# Patient Record
Sex: FEMALE | Race: OTHER | Employment: OTHER | URBAN - METROPOLITAN AREA
[De-identification: names, ages, dates, MRNs, and addresses within clinical notes are randomized per-mention and may not be internally consistent; named-entity substitution may affect disease eponyms.]

---

## 2017-11-09 ENCOUNTER — APPOINTMENT (OUTPATIENT)
Dept: GENERAL RADIOLOGY | Facility: HOSPITAL | Age: 22
End: 2017-11-09
Attending: NURSE PRACTITIONER
Payer: MEDICAID

## 2017-11-09 ENCOUNTER — TELEPHONE (OUTPATIENT)
Dept: INTERNAL MEDICINE CLINIC | Facility: CLINIC | Age: 22
End: 2017-11-09

## 2017-11-09 ENCOUNTER — HOSPITAL ENCOUNTER (EMERGENCY)
Facility: HOSPITAL | Age: 22
Discharge: HOME | End: 2017-11-09
Payer: MEDICAID

## 2017-11-09 VITALS
SYSTOLIC BLOOD PRESSURE: 102 MMHG | WEIGHT: 119.94 LBS | HEART RATE: 72 BPM | OXYGEN SATURATION: 100 % | DIASTOLIC BLOOD PRESSURE: 72 MMHG | TEMPERATURE: 98 F | RESPIRATION RATE: 16 BRPM | BODY MASS INDEX: 23 KG/M2

## 2017-11-09 DIAGNOSIS — M79.671 RIGHT FOOT PAIN: Primary | ICD-10-CM

## 2017-11-09 PROCEDURE — 99283 EMERGENCY DEPT VISIT LOW MDM: CPT

## 2017-11-09 PROCEDURE — 73630 X-RAY EXAM OF FOOT: CPT | Performed by: NURSE PRACTITIONER

## 2017-11-09 NOTE — ED PROVIDER NOTES
Patient Seen in: [facility] AND [facility] Emergency Department    History   Patient presents with:  Bump    Stated Complaint: foot pain    HPI    46-year-old female presents to the emergency department complaining of pain and swelling to the top of her right fo place, and time. No cranial nerve deficit. Skin: Skin is warm and dry. Nursing note and vitals reviewed.           ED Course   Labs Reviewed - No data to display    ED Course as of Nov 09 1324  -----------------------------------------------------------

## 2017-11-09 NOTE — TELEPHONE ENCOUNTER
Patient's mother calling - verified pt's  and HIPAA - states pt has a bump on her the top of her right foot that has been growing the last couple days, today the pain grew to the point where she went to the [facility] ER. (pt is still in the ER at this moment

## 2017-11-10 ENCOUNTER — OFFICE VISIT (OUTPATIENT)
Dept: PODIATRY CLINIC | Facility: CLINIC | Age: 22
End: 2017-11-10

## 2017-11-10 DIAGNOSIS — M67.471 GANGLION CYST OF RIGHT FOOT: Primary | ICD-10-CM

## 2017-11-10 PROCEDURE — 99212 OFFICE O/P EST SF 10 MIN: CPT | Performed by: PODIATRIST

## 2017-11-10 PROCEDURE — 99243 OFF/OP CNSLTJ NEW/EST LOW 30: CPT | Performed by: PODIATRIST

## 2017-11-10 NOTE — PROGRESS NOTES
HPI:    Patient ID: Real Guerra is a 23year old female. HPI  This 72-year-old female presents as a new patient to me on consult from .   She is accompanied today by her mom and the primary concern is a painful lump on the dorsal aspect of shoes with support at all times. I instructed icing twice every evening for 15 minutes for the next 2 weeks. Discontinue treatment and reevaluate a week later if there is any concern. I anticipate seeing this patient in 3 weeks.   She and her mom alesia

## 2018-03-02 ENCOUNTER — HOSPITAL ENCOUNTER (EMERGENCY)
Facility: HOSPITAL | Age: 23
Discharge: HOME | End: 2018-03-03
Attending: EMERGENCY MEDICINE
Payer: MEDICAID

## 2018-03-02 DIAGNOSIS — H10.32 ACUTE CONJUNCTIVITIS OF LEFT EYE, UNSPECIFIED ACUTE CONJUNCTIVITIS TYPE: Primary | ICD-10-CM

## 2018-03-02 PROCEDURE — 99283 EMERGENCY DEPT VISIT LOW MDM: CPT

## 2018-03-03 VITALS
HEART RATE: 82 BPM | DIASTOLIC BLOOD PRESSURE: 83 MMHG | HEIGHT: 60 IN | BODY MASS INDEX: 25.52 KG/M2 | RESPIRATION RATE: 14 BRPM | OXYGEN SATURATION: 100 % | SYSTOLIC BLOOD PRESSURE: 120 MMHG | TEMPERATURE: 99 F | WEIGHT: 130 LBS

## 2018-03-03 RX ORDER — CIPROFLOXACIN HYDROCHLORIDE 3.5 MG/ML
1 SOLUTION/ DROPS TOPICAL
Qty: 1 BOTTLE | Refills: 0 | Status: SHIPPED | OUTPATIENT
Start: 2018-03-03 | End: 2018-03-10

## 2018-03-03 NOTE — ED PROVIDER NOTES
Patient Seen in: [facility] AND [facility] Emergency Department    History   Patient presents with: Eye Visual Problem (opthalmic)      HPI    Patient presents to the ED complaining of redness and mild discharge to her left eye for the past 5 days.   She states are equal, round, and reactive to light. Right eye exhibits no discharge. Left eye exhibits no discharge. Mild injection of the left conjunctiva, no evidence for corneal ulceration, foreign body or abrasions. No discharge.    Neck: No tracheal deviation conjunctivitis of left eye, unspecified acute conjunctivitis type  (primary encounter diagnosis)    Disposition:  Discharge    Follow-up:  Riki Aragon MD  [address] [place] [id number] [phone]    Schedule an appointment as soon as yuni

## 2018-04-23 ENCOUNTER — HOSPITAL ENCOUNTER (OUTPATIENT)
Age: 23
Discharge: HOME | End: 2018-04-23
Attending: PEDIATRICS
Payer: MEDICAID

## 2018-04-23 VITALS
SYSTOLIC BLOOD PRESSURE: 113 MMHG | RESPIRATION RATE: 16 BRPM | DIASTOLIC BLOOD PRESSURE: 64 MMHG | OXYGEN SATURATION: 100 % | HEART RATE: 89 BPM | TEMPERATURE: 98 F

## 2018-04-23 DIAGNOSIS — N30.01 ACUTE CYSTITIS WITH HEMATURIA: Primary | ICD-10-CM

## 2018-04-23 PROCEDURE — 81002 URINALYSIS NONAUTO W/O SCOPE: CPT

## 2018-04-23 PROCEDURE — 81025 URINE PREGNANCY TEST: CPT

## 2018-04-23 PROCEDURE — 99214 OFFICE O/P EST MOD 30 MIN: CPT

## 2018-04-23 PROCEDURE — 99213 OFFICE O/P EST LOW 20 MIN: CPT

## 2018-04-23 RX ORDER — SULFAMETHOXAZOLE AND TRIMETHOPRIM 800; 160 MG/1; MG/1
1 TABLET ORAL 2 TIMES DAILY
Qty: 6 TABLET | Refills: 0 | Status: SHIPPED | OUTPATIENT
Start: 2018-04-23 | End: 2018-04-26

## 2018-04-23 NOTE — ED INITIAL ASSESSMENT (HPI)
Pt reporting dysuria and hematuria with urinary urgency and frequency for past couple of days. Denies Flank pain. Denies N/V/D. Denies fever.

## 2018-04-23 NOTE — ED PROVIDER NOTES
Patient Seen in: [facility] In [address] [place] [address]    History   Patient presents with:  Urinary Symptoms (urologic)    Stated Complaint: uti    HPI    Patient complains of urinary frequency, urgency and dysuria that began this am.  Patient d with hematuria  (primary encounter diagnosis)    Disposition:  Discharge    Follow-up:  Deja Cole MD  [address] [place] [id number] [phone]    Schedule an appointment as soon as possible for a visit   As needed      Medications Prescr

## (undated) NOTE — LETTER
November 9, 2017    Patient: Ninfa Snellen   Date of Visit: 11/9/2017       To Whom It May Concern:    Ninfa Snellen was seen and treated in our emergency department on 11/9/2017. She can return to work 11/10/2017.     If you have any questions or c

## (undated) NOTE — ED AVS SNAPSHOT
Roxy Cox   MRN: [id number]    Department:  [facility] Emergency Department   Date of Visit:  11/9/2017           Disclosure     Insurance plans vary and the physician(s) referred by the ER may not be covered by your plan.  Please contact CARE PHYSICIAN AT ONCE OR RETURN IMMEDIATELY TO THE EMERGENCY DEPARTMENT. If you have been prescribed any medication(s), please fill your prescription right away and begin taking the medication(s) as directed.   If you believe that any of the medications

## (undated) NOTE — LETTER
November 10, 2017         MD YUNI Benavidez [phone] [phone]      Patient: Francisco Reynoso   YOB: 1995   Date of Visit: 11/10/2017       Dear Dr. Leisa Vora MD,    I saw your patient, Francisco Reynoso, on 11/10/2017

## (undated) NOTE — ED AVS SNAPSHOT
Malika Foster   MRN: [id number]    Department:  [facility] Emergency Department   Date of Visit:  3/2/2018           Disclosure     Insurance plans vary and the physician(s) referred by the ER may not be covered by your plan.  Please contact y CARE PHYSICIAN AT ONCE OR RETURN IMMEDIATELY TO THE EMERGENCY DEPARTMENT. If you have been prescribed any medication(s), please fill your prescription right away and begin taking the medication(s) as directed.   If you believe that any of the medications